# Patient Record
Sex: MALE | Race: WHITE | URBAN - METROPOLITAN AREA
[De-identification: names, ages, dates, MRNs, and addresses within clinical notes are randomized per-mention and may not be internally consistent; named-entity substitution may affect disease eponyms.]

---

## 2024-07-29 ENCOUNTER — OFFICE VISIT (OUTPATIENT)
Dept: UROLOGY | Facility: CLINIC | Age: 29
End: 2024-07-29
Payer: COMMERCIAL

## 2024-07-29 ENCOUNTER — TELEPHONE (OUTPATIENT)
Dept: UROLOGY | Facility: CLINIC | Age: 29
End: 2024-07-29
Payer: COMMERCIAL

## 2024-07-29 VITALS
SYSTOLIC BLOOD PRESSURE: 125 MMHG | HEIGHT: 67 IN | DIASTOLIC BLOOD PRESSURE: 84 MMHG | HEART RATE: 98 BPM | WEIGHT: 231.06 LBS | BODY MASS INDEX: 36.27 KG/M2

## 2024-07-29 DIAGNOSIS — N50.89 SCROTAL MASS: ICD-10-CM

## 2024-07-29 DIAGNOSIS — R31.0 GROSS HEMATURIA: Primary | ICD-10-CM

## 2024-07-29 LAB
BACTERIA #/AREA URNS AUTO: NORMAL /HPF
BILIRUB SERPL-MCNC: NORMAL MG/DL
BILIRUB UR QL STRIP: NEGATIVE
BLOOD URINE, POC: NORMAL
CLARITY UR REFRACT.AUTO: CLEAR
CLARITY, POC UA: CLEAR
COLOR UR AUTO: YELLOW
COLOR, POC UA: YELLOW
GLUCOSE UR QL STRIP: NEGATIVE
GLUCOSE UR QL STRIP: NORMAL
HGB UR QL STRIP: NEGATIVE
KETONES UR QL STRIP: NEGATIVE
KETONES UR QL STRIP: NORMAL
LEUKOCYTE ESTERASE UR QL STRIP: NEGATIVE
LEUKOCYTE ESTERASE URINE, POC: NORMAL
MICROSCOPIC COMMENT: NORMAL
NITRITE UR QL STRIP: NEGATIVE
NITRITE, POC UA: NORMAL
PH UR STRIP: 6 [PH] (ref 5–8)
PH, POC UA: 5
PROT UR QL STRIP: NEGATIVE
PROTEIN, POC: NORMAL
RBC #/AREA URNS AUTO: 1 /HPF (ref 0–4)
SP GR UR STRIP: 1.03 (ref 1–1.03)
SPECIFIC GRAVITY, POC UA: 1.02
SQUAMOUS #/AREA URNS AUTO: 0 /HPF
URN SPEC COLLECT METH UR: NORMAL
UROBILINOGEN, POC UA: NORMAL
WBC #/AREA URNS AUTO: 1 /HPF (ref 0–5)

## 2024-07-29 PROCEDURE — 99999 PR PBB SHADOW E&M-NEW PATIENT-LVL III: CPT | Mod: PBBFAC,,, | Performed by: UROLOGY

## 2024-07-29 PROCEDURE — 3008F BODY MASS INDEX DOCD: CPT | Mod: CPTII,S$GLB,, | Performed by: UROLOGY

## 2024-07-29 PROCEDURE — 1160F RVW MEDS BY RX/DR IN RCRD: CPT | Mod: CPTII,S$GLB,, | Performed by: UROLOGY

## 2024-07-29 PROCEDURE — 3074F SYST BP LT 130 MM HG: CPT | Mod: CPTII,S$GLB,, | Performed by: UROLOGY

## 2024-07-29 PROCEDURE — 99204 OFFICE O/P NEW MOD 45 MIN: CPT | Mod: S$GLB,,, | Performed by: UROLOGY

## 2024-07-29 PROCEDURE — 3079F DIAST BP 80-89 MM HG: CPT | Mod: CPTII,S$GLB,, | Performed by: UROLOGY

## 2024-07-29 PROCEDURE — 1159F MED LIST DOCD IN RCRD: CPT | Mod: CPTII,S$GLB,, | Performed by: UROLOGY

## 2024-07-29 PROCEDURE — 81001 URINALYSIS AUTO W/SCOPE: CPT | Performed by: UROLOGY

## 2024-07-29 PROCEDURE — 81002 URINALYSIS NONAUTO W/O SCOPE: CPT | Mod: S$GLB,,, | Performed by: UROLOGY

## 2024-07-29 NOTE — PROGRESS NOTES
"[facility] - [facility]rology   Clinic Note    SUBJECTIVE:     Chief Complaint: Gross Hematuria    Referral from: Self, Aaareferral.    History of Present Illness:  Landon Mariano is a 28 y.o. male who presents to clinic for evaluation of gross hematuria.    Here for evaluation of gross hematuria. No recent UTIs. No personal or family history of urologic cancers. Denies any previous smoking history. Denies a previous history of hematuria. He has not had a hematuria workup performed before.  He also notes some right inguinal and groin discomfort, a sensation of a bulge, and what he believes to be some sort of scrotal mass.  He is concerned that it could be a hernia but is unsure.  Denies any history of scrotal or inguinal surgery.  Denies any history of urologic surgery.  No history of stone disease.    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    UA today negative for nitrites and blood    Patient endorses no additional complaints at this time.    History reviewed. No pertinent past medical history.    History reviewed. No pertinent surgical history.    No family history on file.    Social History     Tobacco Use    Smoking status: Never    Smokeless tobacco: Never   Substance Use Topics    Drug use: Never       No current outpatient medications on file prior to visit.     No current facility-administered medications on file prior to visit.       Review of patient's allergies indicates:  No Known Allergies    Review of Systems:  A review of 10+ systems was conducted with pertinent positive and negative findings documented in HPI with all other systems reviewed and negative.    OBJECTIVE:     Estimated body mass index is 36.19 kg/m² as calculated from the following:    Height as of this encounter: 5' 7" (1.702 m).    Weight as of this encounter: 104.8 kg (231 lb 0.7 oz).    Vital Signs (Most Recent)  Vitals:    07/29/24 1332   BP: 125/84   Pulse: 98       Physical Exam:  GENERAL: patient sitting " "comfortably  HEENT: normocephalic  NECK: supple, no JVD  PULM: normal chest rise, no increased WOB  HEART: non-diaphoretic  ABDO: soft, nondistended, nontender  BACK: no CVA tenderness bilaterally  SKIN: warm, dry, well perfused  EXT: no bruising or edema  NEURO: grossly normal with no focal deficits  PSYCH: appropriate mood and affect    Genitourinary Exam:  Patient with a large right-sided scrotal mass that extends up along the spermatic cord and into the inguinal canal.  Differential diagnosis would include a large right hydrocele or a right inguinal hernia.  Will evaluate on the CT scan and with his scrotal ultrasound.    No results found for: "BUN", "CREATININE", "GFRNONAA", "GFRAA", "WBC", "HGB", "HCT", "PLT", "AST", "ALT", "ALKPHOS", "ALBUMIN", "HGBA1C"     PSA:  No results found for: "PSA", "PSADIAG", "PSATOTAL", "PSAFREE"    Imaging:  All relevant imaging studies have been reviewed personally by me.    No results found for this or any previous visit (from the past 2160 hour(s)).  No results found for this or any previous visit (from the past 2160 hour(s)).      ASSESSMENT     1. Gross hematuria    2. Scrotal mass        PLAN:     Gross Hematuria    - He has gross hematuria.    I counseled the patient on the AUA Guidelines for a hematuria workup. An evaluation is recommend on all patients with gross hematuria. The goal of upper tract imaging in patients is to identify malignancies of the renal parenchyma and upper tract urothelium, as well as to identify actionable non-malignant diagnoses of the kidney, collecting system, and ureters. The choice of imaging modality involves tradeoffs between diagnostic accuracy versus risk. The evaluation also involves a flexible cystoscopy performed in the clinic. A urine cytology may also be sent at the time of cystoscopy.    - Plan for flexible cystoscopy, urine cytology, and CT urogram.    2. Scrotal Mass    - differential includes large right-sided hydrocele or inguinal " hernia.  Other rare pathologies could be present.  Will evaluate with CT scan and scrotal ultrasound.    Toni Ro MD  Urology  [facility]    Disclaimer: This note has been generated using voice-recognition software. There may be typographical errors that have been missed during proof-reading.

## 2024-07-29 NOTE — TELEPHONE ENCOUNTER
Spoke with pt and advised that he would have to be seen first by provider to have work up done and then if needed going forward we could schedule him for a cysto. I explained to pt that those procedures have to be scheduled and authorize prior to visit. Pt voiced his understanding

## 2024-07-29 NOTE — TELEPHONE ENCOUNTER
----- Message from Hue Medellin sent at 7/29/2024  7:52 AM CDT -----  Type:  Same Day Appointment Request    Caller is requesting a same day appointment.  Caller declined first available appointment listed below.    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

## 2024-07-30 ENCOUNTER — HOSPITAL ENCOUNTER (OUTPATIENT)
Dept: RADIOLOGY | Facility: HOSPITAL | Age: 29
Discharge: HOME | End: 2024-07-30
Attending: UROLOGY
Payer: COMMERCIAL

## 2024-07-30 DIAGNOSIS — R31.0 GROSS HEMATURIA: ICD-10-CM

## 2024-07-30 DIAGNOSIS — N50.89 SCROTAL MASS: ICD-10-CM

## 2024-07-30 PROCEDURE — 25500020 PHARM REV CODE 255: Performed by: UROLOGY

## 2024-07-30 PROCEDURE — 74178 CT ABD&PLV WO CNTR FLWD CNTR: CPT | Mod: 26,,, | Performed by: RADIOLOGY

## 2024-07-30 PROCEDURE — 76870 US EXAM SCROTUM: CPT | Mod: TC

## 2024-07-30 PROCEDURE — 76870 US EXAM SCROTUM: CPT | Mod: 26,,, | Performed by: RADIOLOGY

## 2024-07-30 PROCEDURE — 74178 CT ABD&PLV WO CNTR FLWD CNTR: CPT | Mod: TC

## 2024-07-30 RX ADMIN — IOHEXOL 150 ML: 350 INJECTION, SOLUTION INTRAVENOUS at 01:07

## 2024-08-09 ENCOUNTER — PROCEDURE VISIT (OUTPATIENT)
Dept: UROLOGY | Facility: CLINIC | Age: 29
End: 2024-08-09
Payer: COMMERCIAL

## 2024-08-09 VITALS
WEIGHT: 229.5 LBS | DIASTOLIC BLOOD PRESSURE: 84 MMHG | HEART RATE: 85 BPM | BODY MASS INDEX: 35.94 KG/M2 | SYSTOLIC BLOOD PRESSURE: 126 MMHG

## 2024-08-09 DIAGNOSIS — N35.919 STRICTURE OF MALE URETHRA, UNSPECIFIED STRICTURE TYPE: ICD-10-CM

## 2024-08-09 DIAGNOSIS — K40.90 RIGHT INGUINAL HERNIA: ICD-10-CM

## 2024-08-09 DIAGNOSIS — R31.0 GROSS HEMATURIA: Primary | ICD-10-CM

## 2024-08-09 LAB — POC RESIDUAL URINE VOLUME: 0 ML (ref 0–100)
